# Patient Record
Sex: FEMALE | Race: WHITE | NOT HISPANIC OR LATINO | Employment: OTHER | URBAN - METROPOLITAN AREA
[De-identification: names, ages, dates, MRNs, and addresses within clinical notes are randomized per-mention and may not be internally consistent; named-entity substitution may affect disease eponyms.]

---

## 2024-04-09 ENCOUNTER — HOSPITAL ENCOUNTER (OUTPATIENT)
Dept: RADIOLOGY | Facility: CLINIC | Age: 84
Discharge: HOME | End: 2024-04-09
Payer: MEDICARE

## 2024-04-09 DIAGNOSIS — Z12.31 ENCOUNTER FOR SCREENING MAMMOGRAM FOR MALIGNANT NEOPLASM OF BREAST: ICD-10-CM

## 2024-04-09 PROCEDURE — 77067 SCR MAMMO BI INCL CAD: CPT

## 2024-04-09 PROCEDURE — 77063 BREAST TOMOSYNTHESIS BI: CPT | Performed by: RADIOLOGY

## 2024-04-09 PROCEDURE — 77067 SCR MAMMO BI INCL CAD: CPT | Performed by: RADIOLOGY

## 2024-04-18 ENCOUNTER — HOSPITAL ENCOUNTER (OUTPATIENT)
Dept: RADIOLOGY | Facility: CLINIC | Age: 84
Discharge: HOME | End: 2024-04-18
Payer: MEDICARE

## 2024-04-18 DIAGNOSIS — R92.8 OTHER ABNORMAL AND INCONCLUSIVE FINDINGS ON DIAGNOSTIC IMAGING OF BREAST: ICD-10-CM

## 2024-04-18 DIAGNOSIS — R92.1 MAMMOGRAPHIC CALCIFICATION FOUND ON DIAGNOSTIC IMAGING OF BREAST: ICD-10-CM

## 2024-04-18 PROCEDURE — 77061 BREAST TOMOSYNTHESIS UNI: CPT | Mod: RT

## 2024-04-18 PROCEDURE — G0279 TOMOSYNTHESIS, MAMMO: HCPCS | Mod: RIGHT SIDE | Performed by: RADIOLOGY

## 2024-04-18 PROCEDURE — 77065 DX MAMMO INCL CAD UNI: CPT | Mod: RIGHT SIDE | Performed by: RADIOLOGY

## 2024-04-19 DIAGNOSIS — R92.8 ABNORMAL FINDING ON BREAST IMAGING: ICD-10-CM

## 2024-04-29 ENCOUNTER — OFFICE VISIT (OUTPATIENT)
Dept: SURGERY | Facility: CLINIC | Age: 84
End: 2024-04-29
Payer: MEDICARE

## 2024-04-29 ENCOUNTER — HOSPITAL ENCOUNTER (OUTPATIENT)
Dept: RADIOLOGY | Facility: HOSPITAL | Age: 84
Discharge: HOME | End: 2024-04-29
Payer: MEDICARE

## 2024-04-29 ENCOUNTER — HOSPITAL ENCOUNTER (OUTPATIENT)
Dept: RADIOLOGY | Facility: CLINIC | Age: 84
Discharge: HOME | End: 2024-04-29
Payer: MEDICARE

## 2024-04-29 VITALS
SYSTOLIC BLOOD PRESSURE: 165 MMHG | BODY MASS INDEX: 43.94 KG/M2 | OXYGEN SATURATION: 93 % | DIASTOLIC BLOOD PRESSURE: 78 MMHG | HEART RATE: 69 BPM | TEMPERATURE: 98.2 F | HEIGHT: 63 IN | WEIGHT: 248 LBS | RESPIRATION RATE: 20 BRPM

## 2024-04-29 VITALS
SYSTOLIC BLOOD PRESSURE: 129 MMHG | OXYGEN SATURATION: 95 % | DIASTOLIC BLOOD PRESSURE: 78 MMHG | HEART RATE: 66 BPM | RESPIRATION RATE: 20 BRPM

## 2024-04-29 DIAGNOSIS — R92.8 ABNORMAL FINDINGS ON DIAGNOSTIC IMAGING OF BREAST: Primary | ICD-10-CM

## 2024-04-29 DIAGNOSIS — R92.8 ABNORMAL FINDING ON BREAST IMAGING: ICD-10-CM

## 2024-04-29 DIAGNOSIS — R92.8 OTHER ABNORMAL AND INCONCLUSIVE FINDINGS ON DIAGNOSTIC IMAGING OF BREAST: ICD-10-CM

## 2024-04-29 PROCEDURE — 1157F ADVNC CARE PLAN IN RCRD: CPT | Performed by: SURGERY

## 2024-04-29 PROCEDURE — 1036F TOBACCO NON-USER: CPT | Performed by: SURGERY

## 2024-04-29 PROCEDURE — 1159F MED LIST DOCD IN RCRD: CPT | Performed by: SURGERY

## 2024-04-29 PROCEDURE — 99203 OFFICE O/P NEW LOW 30 MIN: CPT | Performed by: SURGERY

## 2024-04-29 PROCEDURE — 88305 TISSUE EXAM BY PATHOLOGIST: CPT | Mod: TC,PARLAB | Performed by: SURGERY

## 2024-04-29 PROCEDURE — 1126F AMNT PAIN NOTED NONE PRSNT: CPT | Performed by: SURGERY

## 2024-04-29 PROCEDURE — 76098 X-RAY EXAM SURGICAL SPECIMEN: CPT

## 2024-04-29 PROCEDURE — 77065 DX MAMMO INCL CAD UNI: CPT

## 2024-04-29 PROCEDURE — 99213 OFFICE O/P EST LOW 20 MIN: CPT | Mod: 25 | Performed by: SURGERY

## 2024-04-29 PROCEDURE — 1160F RVW MEDS BY RX/DR IN RCRD: CPT | Performed by: SURGERY

## 2024-04-29 PROCEDURE — 2720000007 HC OR 272 NO HCPCS

## 2024-04-29 PROCEDURE — 19081 BX BREAST 1ST LESION STRTCTC: CPT | Mod: RT

## 2024-04-29 PROCEDURE — 88305 TISSUE EXAM BY PATHOLOGIST: CPT | Performed by: STUDENT IN AN ORGANIZED HEALTH CARE EDUCATION/TRAINING PROGRAM

## 2024-04-29 ASSESSMENT — PAIN SCALES - GENERAL: PAINLEVEL: 0-NO PAIN

## 2024-04-29 ASSESSMENT — ENCOUNTER SYMPTOMS
LOSS OF SENSATION IN FEET: 0
OCCASIONAL FEELINGS OF UNSTEADINESS: 1
DEPRESSION: 0

## 2024-04-29 NOTE — PATIENT INSTRUCTIONS
Thank you for choosing [facility].  Stereotactic biopsy of the right breast will be performed as discussed.  You may experience some bruising following the procedure.  Please use ice, 2 extra strength or 3 ibuprofen every 6 hours as needed for pain.  Results will be immediately available for viewing on the [employer] alexx when completed by the pathologist.  This is usually within 5-10 business days.  Follow-up breast clinic 2 weeks after your biopsy.  Contact the breast clinic for any questions regarding today's exam or your proposed procedure biopsy breast biopsy breast

## 2024-04-29 NOTE — DISCHARGE INSTRUCTIONS
REMOVE YOUR BANDAID OVER THE SITE TOMORROW BEFORE YOU SHOWER. UNDER THE BANDAID ARE STERI-STRIPS, KEEP THOSE ON UNTIL THEY FALL OFF ON THEIR OWN. IF THEY STAY ON FOR 5 DAYS, REMOVE THEM. NO SOAKING IN A BATH, POOL, OR HOT TUB FOR 48 HOURS. NO HEAVY LIFTING FOR 1-2 DAYS. USE ICE 20 MINUTES ON AND 20 MINUTES OFF TODAY. LOOK FOR SIGNS AND SYMPTOMS OF INFECTION- REDNESS, SWELLING, FEVER, AND PAIN. CALL IF YOU HAVE ANY OF THOSE. IT IS NORMAL TO BRUISE. IT CAN LAST UP TO A MONTH. TAKE OVER THE COUNTER PAIN MEDICATIONS FOR PAIN. YOUR RESULTS WILL POST TO YOUR MY CHART IN 3-10 DAYS. CALL WITH ANY QUESTIONS, [phone] OR [facility] BREAST NURSE NAVIGATOR [phone].

## 2024-04-29 NOTE — POST-PROCEDURE NOTE
Interventional Radiology Brief Postprocedure Note    Attending: Myra Walters MD      Assistant:     Diagnosis: right breast calcifications    Description of procedure: stereotactic biopsy right breast     Anesthesia:  Local    Complications: None    Estimated Blood Loss: none    Medications (Filter: Administrations occurring from 1410 to 1410 on 04/29/24)      None          ID Type Source Tests Collected by Time   1 : RIGHT BREAST 0500 12 CM FN Tissue BREAST CORE BIOPSY RIGHT SURGICAL PATHOLOGY EXAM Myra Walters MD 4/29/2024 1355         See detailed result report with images in PACS.    The patient tolerated the procedure well without incident or complication and is in stable condition.

## 2024-04-29 NOTE — PROGRESS NOTES
History Of Present Illness  HPI 83-year-old female underwent screening mammograms which demonstrated abnormal microcalcifications in the right aspect of the right breast.  She has a history of previous biopsy of right breast benign.  She currently denies mass dimpling this document was prepared by voice recognition  technology and unintended errors  may be present.  Her history is otherwise reviewed and listed in chart     Past Medical History  She has a past medical history of Endometrial intraepithelial neoplasia (EIN), Nonrheumatic mitral (valve) insufficiency, Overactive bladder, Overweight, Personal history of other diseases of the circulatory system, Personal history of other diseases of the musculoskeletal system and connective tissue, Personal history of other diseases of the respiratory system, Personal history of other endocrine, nutritional and metabolic disease, Personal history of other specified conditions, Personal history of other specified conditions, and Spondylosis without myelopathy or radiculopathy, cervical region.    Surgical History  She has a past surgical history that includes Other surgical history (06/14/2019); Other surgical history (06/14/2019); Other surgical history (06/14/2019); Incisional breast biopsy (06/14/2019); Dilation and curettage of uterus (06/14/2019); Incisional breast biopsy (06/14/2019); and Hysterectomy.     Social History  She has no history on file for tobacco use, alcohol use, and drug use.    Family History  No family history on file.     Allergies  Patient has no allergy information on record.    Review of Systems review of systems otherwise noncontributory     There were no vitals taken for this visit.     Physical Exam BMI 43.9  Ambulates with assistance device  GENERAL  MENTAL STATUS - Alert. GENERAL APPEARANCE - Cooperative and well groomed. ORIENTATION - Oriented X4. BUILD & NUTRITION - Well nourished and well developed. HYDRATION - Well  hydrated.    INTEGUMENTARY  GENERAL CHARACTERISTICS - Overall examination of the patient´s skin reveals no rashes. COLOR - not icteric. SKIN MOISTURE - normal skin moisture. TEMPERATURE - normal worth is noted.    HEAD AND NECK  HEAD  HEAD SHAPE - Atraumatic and normocephalic.  TRACHEA - midline.  THYROID  GLAND CHARACTERISTICS - normal size and consistency and no palpable nodules.    EYE  SCLERA/CONJUNCTIVA - BILATERAL - Anicteric.    CHEST AND LUNG EXAM  AUSCULTATION -  BREATH SOUNDS - Clear.    BREAST  NIPPLES: CHARACTERISTICS - LEFT - No rash. Not inverted. RIGHT - No rash. Not Inverted. DISCHARGE - LEFT - None. DISCHARGE - RIGHT - None.  BREAST - LEFT - Non tender. No mass, skin changes, biopsy scars, dimpling or dimpling or [place]au [place]. RIGHT - Non tender. No mass, skin changes, biopsy scars, dimpling or dimpling or [place]. BILATERAL - Symmetric.    CARDIOVASCULAR  AUSCULTATION: RHYTHM - Regular. HEART SOUNDS - Normal heart sounds.  MURMURS & OTHER HEART SOUNDS - Auscultation of the heart reveals regular rate and no murmurs.    ABDOMEN  PALPATION/PERCUSSION - Palpation and percussion of the abdomen reveal soft, non tender, no mass and no hepatosplenomegaly.    LYMPHATIC  GENERAL LYMPHATICS  BREAST LYMPHATIC EXAM - No cervical adenopathy, supraclavicular adenopathy or axilliary adenopathy.         Last Recorded Vitals  There were no vitals taken for this visit.    Relevant Results      BI mammo right diagnostic tomosynthesis    Result Date: 4/18/2024  Interpreted By:  Tor Hernández, STUDY: BI MAMMO RIGHT DIAGNOSTIC TOMOSYNTHESIS;  4/18/2024 3:39 pm   ACCESSION NUMBER(S): [id number]   ORDERING CLINICIAN: LACEY HAM   INDICATION: Signs/Symptoms:abnormal findings.   COMPARISON: Screening mammogram 04/09/2024   FINDINGS: 2D and tomosynthesis images were reviewed at 1 mm slice thickness. Magnification and true lateral views obtained.   Density:  The breast tissue is heterogeneously dense, which may  obscure small masses.   Grouping of microcalcifications at the inferomedial aspect are better visualized on magnification views and appear somewhat heterogeneous. Biopsy marker clips redemonstrated superiorly with some associated density likely relating to post biopsy changes. No definite additional suspicious masses or calcifications are otherwise identified.       Right breast microcalcifications as described for which stereotactic biopsy recommended.   Findings were communicated to the patient.   BI-RADS CATEGORY:   BI-RADS Category:  4 Suspicious. Recommendation:  Surgical Consultation and Biopsy. Recommended Date:  Immediate. Laterality:  Right.   For any future breast imaging appointments, please call [phone]).     MACRO: Critical Finding:  See findings. Notification was initiated on 4/18/2024 at 5:15 pm by  Tor Hernández.  (**-YCF-**) Instructions:   Signed by: Tor Hernández 4/18/2024 5:15 PM Dictation workstation:   [id number]    BI mammo bilateral screening tomosynthesis    Result Date: 4/10/2024  Interpreted By:  Myra Walters, STUDY: BI MAMMO BILATERAL SCREENING TOMOSYNTHESIS;  4/9/2024 2:46 pm   ACCESSION NUMBER(S): [id number]   ORDERING CLINICIAN: LACEY HAM   INDICATION: Screening. The study possible was performed. 2 technologists were needed in this was performed in a wheelchair   COMPARISON: 03/13/2023,   FINDINGS: 2D and tomosynthesis images were reviewed at 1 mm slice thickness.   Density:  The breast tissue is heterogeneously dense, which may obscure small masses.   There are calcifications in the lower-inner quadrant of the right breast for which spot compression magnification views are recommended. There are benign coarse calcifications. There are benign vascular calcifications. There are scattered round microcalcifications. There are 2 post biopsy clips in the right breast. No suspicious mass, skin thickening or axillary adenopathy is identified       1. Spot compression  magnification views microcalcifications right breast. 2. No malignancy left breast     Based on the Tyrer-Cuzick model for breast cancer risk assessment, the patient's lifetime risk of breast cancer is 0.95%. Patients with over a 20% lifetime risk of developing breast cancer may benefit from additional screening with breast MRI or ultrasound. Please note that this estimate is based on responses provided on the patient questionnaire. For more information regarding high risk consultation, please call [phone].   BI-RADS CATEGORY:   BI-RADS Category:  0 Incomplete; Need Additional Imaging Evaluation and/or Prior Mammograms for Comparison. Recommendation:  Additional Imaging. Recommended Date:  Immediate. Laterality:  Right.   For any future breast imaging appointments, please call [phone] ([phone]).   MACRO: None     Signed by: Myra Walters 4/10/2024 1:46 PM Dictation workstation:   [id number]    XR chest 2 views    Result Date: 4/2/2024  EXAMINATION: XR CHEST PA+LAT 2 VIEWS 04/02/2024 05:56 PM CLINICAL HISTORY: Chest pain ASSOCIATED DIAGNOSIS: Chest pain ORDERING PROVIDER: MELO ANDREWS TECHNOLOGISTS NOTE: COMPARISON: XR CHEST PA+LAT 10/23/2019, 3:44 PM FINDINGS: Cardiomediastinal silhouette: Normal. Lungs/Pleura: No focal pulmonary consolidation, effusion or pneumothorax. Musculoskeletal: Degenerative spurring within the thoracic spine. IMPRESSION: No acute cardiopulmonary abnormality identified. MACRO: None        @[facility]@    Assessment/Plan       I have reviewed the diagnostic imaging and agree with the recommendation for stereotactic biopsy.  I discussed the risk and benefits the alternatives as well as the risk and benefits the alternatives procedure including but not limited to the risk of bleeding infection for additional procedures for diagnosis and/or treatment.  Questions were answered.  Patient agrees to proceed       I spent 20 minutes in the professional and overall care of this  patient.      Lanre Garsia MD

## 2024-05-03 LAB
LABORATORY COMMENT REPORT: NORMAL
PATH REPORT.FINAL DX SPEC: NORMAL
PATH REPORT.GROSS SPEC: NORMAL
PATH REPORT.TOTAL CANCER: NORMAL

## 2024-05-08 ENCOUNTER — OFFICE VISIT (OUTPATIENT)
Dept: SURGERY | Facility: CLINIC | Age: 84
End: 2024-05-08
Payer: MEDICARE

## 2024-05-08 VITALS
WEIGHT: 253 LBS | BODY MASS INDEX: 44.83 KG/M2 | OXYGEN SATURATION: 96 % | HEART RATE: 67 BPM | HEIGHT: 63 IN | RESPIRATION RATE: 22 BRPM | SYSTOLIC BLOOD PRESSURE: 157 MMHG | TEMPERATURE: 98 F | DIASTOLIC BLOOD PRESSURE: 91 MMHG

## 2024-05-08 DIAGNOSIS — R92.8 ABNORMAL FINDINGS ON DIAGNOSTIC IMAGING OF BREAST: Primary | ICD-10-CM

## 2024-05-08 PROCEDURE — 1159F MED LIST DOCD IN RCRD: CPT | Performed by: SURGERY

## 2024-05-08 PROCEDURE — 1126F AMNT PAIN NOTED NONE PRSNT: CPT | Performed by: SURGERY

## 2024-05-08 PROCEDURE — 1157F ADVNC CARE PLAN IN RCRD: CPT | Performed by: SURGERY

## 2024-05-08 PROCEDURE — 1160F RVW MEDS BY RX/DR IN RCRD: CPT | Performed by: SURGERY

## 2024-05-08 PROCEDURE — 99213 OFFICE O/P EST LOW 20 MIN: CPT | Performed by: SURGERY

## 2024-05-08 ASSESSMENT — ENCOUNTER SYMPTOMS
OCCASIONAL FEELINGS OF UNSTEADINESS: 1
LOSS OF SENSATION IN FEET: 1
DEPRESSION: 1

## 2024-05-08 ASSESSMENT — COLUMBIA-SUICIDE SEVERITY RATING SCALE - C-SSRS
2. HAVE YOU ACTUALLY HAD ANY THOUGHTS OF KILLING YOURSELF?: NO
6. HAVE YOU EVER DONE ANYTHING, STARTED TO DO ANYTHING, OR PREPARED TO DO ANYTHING TO END YOUR LIFE?: NO
1. IN THE PAST MONTH, HAVE YOU WISHED YOU WERE DEAD OR WISHED YOU COULD GO TO SLEEP AND NOT WAKE UP?: NO

## 2024-05-08 ASSESSMENT — PAIN SCALES - GENERAL: PAINLEVEL: 0-NO PAIN

## 2024-05-08 NOTE — PATIENT INSTRUCTIONS
Thank you for choosing [facility].  Your recent right stereotactic biopsy site is without signs of infection.  Pathological analysis of the area demonstrates no evidence of cancer nor cells to indicate an increased risk of cancer.  Continue monthly self exam.  Annual mammograms recommended with clinical follow-up.  Contact the breast clinic for any questions regarding today's exam or your recent procedure

## 2024-05-08 NOTE — PROGRESS NOTES
History Of Present Illness  HPI follow-up stereotactic biopsy right breast April 29.  Pathology benign.  Minimal bruising only.  Denies redness swelling drainage.     Past Medical History  She has a past medical history of Endometrial intraepithelial neoplasia (EIN), Nonrheumatic mitral (valve) insufficiency, Overactive bladder, Overweight, Personal history of other diseases of the circulatory system, Personal history of other diseases of the musculoskeletal system and connective tissue, Personal history of other diseases of the respiratory system, Personal history of other endocrine, nutritional and metabolic disease, Personal history of other specified conditions, Personal history of other specified conditions, and Spondylosis without myelopathy or radiculopathy, cervical region.    Surgical History  She has a past surgical history that includes Other surgical history (06/14/2019); Other surgical history (06/14/2019); Other surgical history (06/14/2019); Incisional breast biopsy (06/14/2019); Dilation and curettage of uterus (06/14/2019); Incisional breast biopsy (06/14/2019); and Hysterectomy.     Social History  She reports that she has never smoked. She has never used smokeless tobacco. She reports that she does not currently use alcohol. She reports that she does not currently use drugs.    Family History  No family history on file.     Allergies  Bee venom protein (honey bee), Haemophilus influenzae type b, Metronidazole, Chlorhexidine, Dextromethorphan-guaifenesin, Flu vac 2023 65up-ocfdw99d(pf), Fluconazole, Moxifloxacin, Oxycodone, Peanut, Peas, Pneumococcal 7-michael conj vacc, Strawberry, Strawberry flavor, Sulfamethoxazole-trimethoprim, Tetracyclines, Trimethoprim, Cephalexin, Diphenhydramine, Erythromycin, Hydroxyzine, Ilosone, Oxaprozin, Oxycodone-acetaminophen, Paroxetine, Penicillins, Prednisone, Propoxyphene, Sulfamethoxazole, Terbinafine, Tetanus vaccines and toxoid, and Verapamil    Review of  Systems  10 review of systems otherwise negative  There were no vitals taken for this visit.     Physical Exam in wheelchair  GENERAL  MENTAL STATUS - Alert. GENERAL APPEARANCE - Cooperative and well groomed. ORIENTATION - Oriented X4. BUILD & NUTRITION - Well nourished and well developed. HYDRATION - Well hydrated.    INTEGUMENTARY  GENERAL CHARACTERISTICS - Overall examination of the patient´s skin reveals no rashes. COLOR - not icteric. SKIN MOISTURE - normal skin moisture. TEMPERATURE - normal worth is noted.    HEAD AND NECK  HEAD  HEAD SHAPE - Atraumatic and normocephalic.  TRACHEA - midline.  THYROID  GLAND CHARACTERISTICS - normal size and consistency and no palpable nodules.    EYE  SCLERA/CONJUNCTIVA - BILATERAL - Anicteric.    CHEST AND LUNG EXAM  AUSCULTATION -  BREATH SOUNDS - Clear.    BREAST  NIPPLES: CHARACTERISTICS - LEFT - No rash. Not inverted. RIGHT - No rash. Not Inverted. DISCHARGE - LEFT - None. DISCHARGE - RIGHT - None.  BREAST - LEFT - Non tender. No mass, skin changes, biopsy scars, dimpling or dimpling or Peau d´Orange. RIGHT - Non tender. No mass, ecchymosis noted at 3:00 not red not warm, no dimpling or dimpling or Peau d´Orange. BILATERAL - Symmetric.    CARDIOVASCULAR  AUSCULTATION: RHYTHM - Regular. HEART SOUNDS - Normal heart sounds.  MURMURS & OTHER HEART SOUNDS - Auscultation of the heart reveals regular rate and no murmurs.    ABDOMEN  PALPATION/PERCUSSION - Palpation and percussion of the abdomen reveal soft, non tender, no mass and no hepatosplenomegaly.    LYMPHATIC  GENERAL LYMPHATICS  BREAST LYMPHATIC EXAM - No cervical adenopathy, supraclavicular adenopathy or axilliary adenopathy.         Last Recorded Vitals  There were no vitals taken for this visit.    Relevant Results      BI breast biopsy clip imaging    Addendum Date: 5/3/2024    Interpreted By:  Myra Walters, ADDENDUM: The final pathology for stereotactic biopsy microcalcifications right breast: Benign breast  tissue with fibroadenomatoid changes and associated stromal calcifications. Pathology is benign-concordant   Recommendation: Routine mammographic screening can resume   MACRO: Critical Finding:  See findings. Notification was initiated on 5/3/2024 at 4:17 pm by  Myra Walters.  (**-YCF-**) Instructions:     Signed by: Myra Walters 5/3/2024 4:17 PM   -------- ORIGINAL REPORT -------- Dictation workstation:   [id number]    Result Date: 5/3/2024  Interpreted By:  Myra Walters, STUDY: BI RAD BREAST EXAM SPECIMEN; BI BREAST BIOPSY CLIP IMAGING; BI STEREOTACTIC GUIDED BREAST RIGHT LOCALIZATION AND BIOPSY;  4/29/2024 2:15 pm; 4/29/2024 2:14 pm; 4/29/2024 2:12 pm   ACCESSION NUMBER(S): [id number]; [id number]; [id number]   ORDERING CLINICIAN: ESTHER CUI   INDICATION: Signs/Symptoms:routine; Signs/Symptoms:post biopsy; Signs/Symptoms:right breast calcifications.  Right breast calcifications.   TECHNIQUE: PREPROCEDURE CONSULTATION: The procedure was explained to the patient including the risks, benefits, and alternatives.  Medications were discussed, including any prior use of blood thinning medications by the patient.  Patient allergies were reviewed.   The risks, including but not limited to infection and bleeding, were reviewed and the patient agreed to undergo the procedure.   Prior to the procedure, an audible timeout was done to verify patient identification, site and type of procedure.  The  right breast was marked prior to the procedure. Dr. Walters, a radiology nurse, and a mammography technologist were present.   FINDINGS: PROCEDURE: An upright vacuum assisted stereotactic biopsy was performed. A  view of the  right breast localized the calcifications of concern. A  medialapproach was used. The breast was prepped and draped in a sterile manner. 10 ML of buffered 1% lidocaine was injected subcutaneously and then into the deeper tissues. A small incision was made. Multiple tissue core  specimens were then obtained with a 9-gauge vacuum assisted biopsy needle with minimal bleeding (estimated blood loss less than 10 mL). The specimen radiograph demonstrated multiple calcifications in the tissues. A Securmark was then placed into the biopsy site. Postprocedure digital films confirm the clip is in place at the biopsy site. After discharging the patient, the tissue was immediately sent to the pathology department.   The patient experienced no complications during the procedure. Home-going/follow-up instructions were reviewed with the patient before she left the department.   POSTPROCEDURE MAMMOGRAM: Postprocedure mammogram is concordant. There is a titanium clip at the site of the previously seen microcalcifications in the  right breast. This confirms that the microcalcifications biopsied stereotactically are the same microcalcifications seen mammographically.       Status post stereotactic guided core needle biopsy of  right breast calcifications followed by tissue marker placement. Postprocedure mammogram concordant. Pathology is pending.   MACRO: None   Signed by: Myra Walters 4/29/2024 3:52 PM Dictation workstation:   [id number]    BI stereotactic guided breast right localization and biopsy    Addendum Date: 5/3/2024    Interpreted By:  Myra Walters, ADDENDUM: The final pathology for stereotactic biopsy microcalcifications right breast: Benign breast tissue with fibroadenomatoid changes and associated stromal calcifications. Pathology is benign-concordant   Recommendation: Routine mammographic screening can resume   MACRO: Critical Finding:  See findings. Notification was initiated on 5/3/2024 at 4:17 pm by  Myra Walters.  (**-YCF-**) Instructions:     Signed by: Myra Walters 5/3/2024 4:17 PM   -------- ORIGINAL REPORT -------- Dictation workstation:   [id number]    Result Date: 5/3/2024  Interpreted By:  Myra Walters, STUDY: BI RAD BREAST EXAM SPECIMEN; BI BREAST BIOPSY CLIP  IMAGING; BI STEREOTACTIC GUIDED BREAST RIGHT LOCALIZATION AND BIOPSY;  4/29/2024 2:15 pm; 4/29/2024 2:14 pm; 4/29/2024 2:12 pm   ACCESSION NUMBER(S): [id number]; [id number]; [id number]   ORDERING CLINICIAN: ESTHER CUI   INDICATION: Signs/Symptoms:routine; Signs/Symptoms:post biopsy; Signs/Symptoms:right breast calcifications.  Right breast calcifications.   TECHNIQUE: PREPROCEDURE CONSULTATION: The procedure was explained to the patient including the risks, benefits, and alternatives.  Medications were discussed, including any prior use of blood thinning medications by the patient.  Patient allergies were reviewed.   The risks, including but not limited to infection and bleeding, were reviewed and the patient agreed to undergo the procedure.   Prior to the procedure, an audible timeout was done to verify patient identification, site and type of procedure.  The  right breast was marked prior to the procedure. Dr. Walters, a radiology nurse, and a mammography technologist were present.   FINDINGS: PROCEDURE: An upright vacuum assisted stereotactic biopsy was performed. A  view of the  right breast localized the calcifications of concern. A  medialapproach was used. The breast was prepped and draped in a sterile manner. 10 ML of buffered 1% lidocaine was injected subcutaneously and then into the deeper tissues. A small incision was made. Multiple tissue core specimens were then obtained with a 9-gauge vacuum assisted biopsy needle with minimal bleeding (estimated blood loss less than 10 mL). The specimen radiograph demonstrated multiple calcifications in the tissues. A Securmark was then placed into the biopsy site. Postprocedure digital films confirm the clip is in place at the biopsy site. After discharging the patient, the tissue was immediately sent to the pathology department.   The patient experienced no complications during the procedure. Home-going/follow-up instructions were reviewed with the  patient before she left the department.   POSTPROCEDURE MAMMOGRAM: Postprocedure mammogram is concordant. There is a titanium clip at the site of the previously seen microcalcifications in the  right breast. This confirms that the microcalcifications biopsied stereotactically are the same microcalcifications seen mammographically.       Status post stereotactic guided core needle biopsy of  right breast calcifications followed by tissue marker placement. Postprocedure mammogram concordant. Pathology is pending.   MACRO: None   Signed by: Myra Walters 4/29/2024 3:52 PM Dictation workstation:   [id number]    BI RAD breast exam specimen    Addendum Date: 5/3/2024    Interpreted By:  Myra Walters, ADDENDUM: The final pathology for stereotactic biopsy microcalcifications right breast: Benign breast tissue with fibroadenomatoid changes and associated stromal calcifications. Pathology is benign-concordant   Recommendation: Routine mammographic screening can resume   MACRO: Critical Finding:  See findings. Notification was initiated on 5/3/2024 at 4:17 pm by  Myra Walters.  (**-YCF-**) Instructions:     Signed by: Myra Walters 5/3/2024 4:17 PM   -------- ORIGINAL REPORT -------- Dictation workstation:   [id number]    Result Date: 5/3/2024  Interpreted By:  Myra Walters, STUDY: BI RAD BREAST EXAM SPECIMEN; BI BREAST BIOPSY CLIP IMAGING; BI STEREOTACTIC GUIDED BREAST RIGHT LOCALIZATION AND BIOPSY;  4/29/2024 2:15 pm; 4/29/2024 2:14 pm; 4/29/2024 2:12 pm   ACCESSION NUMBER(S): [id number]; [id number]; [id number]   ORDERING CLINICIAN: ESTHER CUI   INDICATION: Signs/Symptoms:routine; Signs/Symptoms:post biopsy; Signs/Symptoms:right breast calcifications.  Right breast calcifications.   TECHNIQUE: PREPROCEDURE CONSULTATION: The procedure was explained to the patient including the risks, benefits, and alternatives.  Medications were discussed, including any prior use of blood thinning medications by the  patient.  Patient allergies were reviewed.   The risks, including but not limited to infection and bleeding, were reviewed and the patient agreed to undergo the procedure.   Prior to the procedure, an audible timeout was done to verify patient identification, site and type of procedure.  The  right breast was marked prior to the procedure. Dr. Walters, a radiology nurse, and a mammography technologist were present.   FINDINGS: PROCEDURE: An upright vacuum assisted stereotactic biopsy was performed. A  view of the  right breast localized the calcifications of concern. A  medialapproach was used. The breast was prepped and draped in a sterile manner. 10 ML of buffered 1% lidocaine was injected subcutaneously and then into the deeper tissues. A small incision was made. Multiple tissue core specimens were then obtained with a 9-gauge vacuum assisted biopsy needle with minimal bleeding (estimated blood loss less than 10 mL). The specimen radiograph demonstrated multiple calcifications in the tissues. A Securmark was then placed into the biopsy site. Postprocedure digital films confirm the clip is in place at the biopsy site. After discharging the patient, the tissue was immediately sent to the pathology department.   The patient experienced no complications during the procedure. Home-going/follow-up instructions were reviewed with the patient before she left the department.   POSTPROCEDURE MAMMOGRAM: Postprocedure mammogram is concordant. There is a titanium clip at the site of the previously seen microcalcifications in the  right breast. This confirms that the microcalcifications biopsied stereotactically are the same microcalcifications seen mammographically.       Status post stereotactic guided core needle biopsy of  right breast calcifications followed by tissue marker placement. Postprocedure mammogram concordant. Pathology is pending.   MACRO: None   Signed by: Myra Walters 4/29/2024 3:52 PM Dictation  workstation:   [id number]    BI mammo right diagnostic tomosynthesis    Result Date: 4/18/2024  Interpreted By:  Tor Hernández, STUDY: BI MAMMO RIGHT DIAGNOSTIC TOMOSYNTHESIS;  4/18/2024 3:39 pm   ACCESSION NUMBER(S): [id number]   ORDERING CLINICIAN: LACEY HAM   INDICATION: Signs/Symptoms:abnormal findings.   COMPARISON: Screening mammogram 04/09/2024   FINDINGS: 2D and tomosynthesis images were reviewed at 1 mm slice thickness. Magnification and true lateral views obtained.   Density:  The breast tissue is heterogeneously dense, which may obscure small masses.   Grouping of microcalcifications at the inferomedial aspect are better visualized on magnification views and appear somewhat heterogeneous. Biopsy marker clips redemonstrated superiorly with some associated density likely relating to post biopsy changes. No definite additional suspicious masses or calcifications are otherwise identified.       Right breast microcalcifications as described for which stereotactic biopsy recommended.   Findings were communicated to the patient.   BI-RADS CATEGORY:   BI-RADS Category:  4 Suspicious. Recommendation:  Surgical Consultation and Biopsy. Recommended Date:  Immediate. Laterality:  Right.   For any future breast imaging appointments, please call [phone]).     MACRO: Critical Finding:  See findings. Notification was initiated on 4/18/2024 at 5:15 pm by  Tor Hernández.  (**-YCF-**) Instructions:   Signed by: Tor Hernández 4/18/2024 5:15 PM Dictation workstation:   [id number]    BI mammo bilateral screening tomosynthesis    Result Date: 4/10/2024  Interpreted By:  Myra Walters, STUDY: BI MAMMO BILATERAL SCREENING TOMOSYNTHESIS;  4/9/2024 2:46 pm   ACCESSION NUMBER(S): [id number]   ORDERING CLINICIAN: LACEY HAM   INDICATION: Screening. The study possible was performed. 2 technologists were needed in this was performed in a wheelchair   COMPARISON: 03/13/2023,   FINDINGS: 2D and  tomosynthesis images were reviewed at 1 mm slice thickness.   Density:  The breast tissue is heterogeneously dense, which may obscure small masses.   There are calcifications in the lower-inner quadrant of the right breast for which spot compression magnification views are recommended. There are benign coarse calcifications. There are benign vascular calcifications. There are scattered round microcalcifications. There are 2 post biopsy clips in the right breast. No suspicious mass, skin thickening or axillary adenopathy is identified       1. Spot compression magnification views microcalcifications right breast. 2. No malignancy left breast     Based on the Tyrer-Cuzick model for breast cancer risk assessment, the patient's lifetime risk of breast cancer is 0.95%. Patients with over a 20% lifetime risk of developing breast cancer may benefit from additional screening with breast MRI or ultrasound. Please note that this estimate is based on responses provided on the patient questionnaire. For more information regarding high risk consultation, please call [phone].   BI-RADS CATEGORY:   BI-RADS Category:  0 Incomplete; Need Additional Imaging Evaluation and/or Prior Mammograms for Comparison. Recommendation:  Additional Imaging. Recommended Date:  Immediate. Laterality:  Right.   For any future breast imaging appointments, please call [phone]).   MACRO: None     Signed by: Myra Watlers 4/10/2024 1:46 PM Dictation workstation:   [id number]        @Smore@    Assessment/Plan       I have reviewed the diagnostic imaging from the procedure as well as the pathological report.  Findings concordant and pathology is benign.  Annual mammograms.  Continue monthly self-exam       I spent 20 minutes in the professional and overall care of this patient.      Lanre Garsia MD

## 2025-03-19 ENCOUNTER — APPOINTMENT (OUTPATIENT)
Dept: SURGERY | Facility: CLINIC | Age: 85
End: 2025-03-19
Payer: MEDICARE

## 2025-03-19 ENCOUNTER — APPOINTMENT (OUTPATIENT)
Dept: RADIOLOGY | Facility: CLINIC | Age: 85
End: 2025-03-19
Payer: MEDICARE

## 2025-03-26 ENCOUNTER — APPOINTMENT (OUTPATIENT)
Dept: SURGERY | Facility: CLINIC | Age: 85
End: 2025-03-26
Payer: MEDICARE

## 2025-03-26 ENCOUNTER — APPOINTMENT (OUTPATIENT)
Dept: RADIOLOGY | Facility: CLINIC | Age: 85
End: 2025-03-26
Payer: MEDICARE

## 2025-04-16 ENCOUNTER — HOSPITAL ENCOUNTER (OUTPATIENT)
Dept: RADIOLOGY | Facility: CLINIC | Age: 85
Discharge: HOME | End: 2025-04-16
Payer: MEDICARE

## 2025-04-16 ENCOUNTER — OFFICE VISIT (OUTPATIENT)
Dept: SURGERY | Facility: CLINIC | Age: 85
End: 2025-04-16
Payer: MEDICARE

## 2025-04-16 ENCOUNTER — APPOINTMENT (OUTPATIENT)
Dept: RADIOLOGY | Facility: HOSPITAL | Age: 85
End: 2025-04-16
Payer: MEDICARE

## 2025-04-16 VITALS
OXYGEN SATURATION: 96 % | HEIGHT: 61 IN | HEART RATE: 73 BPM | SYSTOLIC BLOOD PRESSURE: 159 MMHG | TEMPERATURE: 96.5 F | DIASTOLIC BLOOD PRESSURE: 85 MMHG | BODY MASS INDEX: 45.31 KG/M2 | WEIGHT: 240 LBS | RESPIRATION RATE: 20 BRPM

## 2025-04-16 VITALS — BODY MASS INDEX: 44.92 KG/M2 | WEIGHT: 253.53 LBS | HEIGHT: 63 IN

## 2025-04-16 DIAGNOSIS — Z12.31 BREAST CANCER SCREENING BY MAMMOGRAM: Primary | ICD-10-CM

## 2025-04-16 DIAGNOSIS — R92.8 ABNORMAL FINDINGS ON DIAGNOSTIC IMAGING OF BREAST: ICD-10-CM

## 2025-04-16 PROCEDURE — 77062 BREAST TOMOSYNTHESIS BI: CPT

## 2025-04-16 PROCEDURE — 99213 OFFICE O/P EST LOW 20 MIN: CPT | Performed by: SURGERY

## 2025-04-16 PROCEDURE — 1159F MED LIST DOCD IN RCRD: CPT | Performed by: SURGERY

## 2025-04-16 PROCEDURE — 1036F TOBACCO NON-USER: CPT | Performed by: SURGERY

## 2025-04-16 PROCEDURE — 1157F ADVNC CARE PLAN IN RCRD: CPT | Performed by: SURGERY

## 2025-04-16 PROCEDURE — 1126F AMNT PAIN NOTED NONE PRSNT: CPT | Performed by: SURGERY

## 2025-04-16 SDOH — ECONOMIC STABILITY: FOOD INSECURITY: WITHIN THE PAST 12 MONTHS, YOU WORRIED THAT YOUR FOOD WOULD RUN OUT BEFORE YOU GOT MONEY TO BUY MORE.: NEVER TRUE

## 2025-04-16 SDOH — ECONOMIC STABILITY: FOOD INSECURITY: WITHIN THE PAST 12 MONTHS, THE FOOD YOU BOUGHT JUST DIDN'T LAST AND YOU DIDN'T HAVE MONEY TO GET MORE.: NEVER TRUE

## 2025-04-16 ASSESSMENT — PAIN SCALES - GENERAL: PAINLEVEL_OUTOF10: 0-NO PAIN

## 2025-04-16 ASSESSMENT — PATIENT HEALTH QUESTIONNAIRE - PHQ9
1. LITTLE INTEREST OR PLEASURE IN DOING THINGS: NOT AT ALL
SUM OF ALL RESPONSES TO PHQ9 QUESTIONS 1 & 2: 0
2. FEELING DOWN, DEPRESSED OR HOPELESS: NOT AT ALL

## 2025-04-16 ASSESSMENT — COLUMBIA-SUICIDE SEVERITY RATING SCALE - C-SSRS
1. IN THE PAST MONTH, HAVE YOU WISHED YOU WERE DEAD OR WISHED YOU COULD GO TO SLEEP AND NOT WAKE UP?: NO
6. HAVE YOU EVER DONE ANYTHING, STARTED TO DO ANYTHING, OR PREPARED TO DO ANYTHING TO END YOUR LIFE?: NO
2. HAVE YOU ACTUALLY HAD ANY THOUGHTS OF KILLING YOURSELF?: NO

## 2025-04-16 ASSESSMENT — ENCOUNTER SYMPTOMS
DEPRESSION: 0
LOSS OF SENSATION IN FEET: 0
OCCASIONAL FEELINGS OF UNSTEADINESS: 1

## 2025-04-16 ASSESSMENT — LIFESTYLE VARIABLES
HOW MANY STANDARD DRINKS CONTAINING ALCOHOL DO YOU HAVE ON A TYPICAL DAY: PATIENT DOES NOT DRINK
HOW OFTEN DO YOU HAVE SIX OR MORE DRINKS ON ONE OCCASION: NEVER
SKIP TO QUESTIONS 9-10: 1
AUDIT-C TOTAL SCORE: 0
HOW OFTEN DO YOU HAVE A DRINK CONTAINING ALCOHOL: NEVER

## 2025-04-16 NOTE — PATIENT INSTRUCTIONS
Thank you for choosing [facility].  Today's breast exam as well as your mammograms are without abnormality following your right breast biopsy in 2024.  Please continue monthly self-exam.  Please seek attention for any change in self-exam including but not limited to mass dimpling discharge or any concern.  Annual mammograms with exam are recommended.

## 2025-04-16 NOTE — PROGRESS NOTES
History Of Present Illness  HPI 1 year follow-up to benign right stereotactic breast biopsy.  Currently denies mass dimpling discharge in either breast     Past Medical History  She has a past medical history of Endometrial intraepithelial neoplasia (EIN), Nonrheumatic mitral (valve) insufficiency, Overactive bladder, Overweight, Personal history of other diseases of the circulatory system, Personal history of other diseases of the musculoskeletal system and connective tissue, Personal history of other diseases of the respiratory system, Personal history of other endocrine, nutritional and metabolic disease, Personal history of other specified conditions, Personal history of other specified conditions, and Spondylosis without myelopathy or radiculopathy, cervical region.    Surgical History  She has a past surgical history that includes Other surgical history (06/14/2019); Other surgical history (06/14/2019); Other surgical history (06/14/2019); Incisional breast biopsy (06/14/2019); Dilation and curettage of uterus (06/14/2019); Incisional breast biopsy (06/14/2019); Hysterectomy; and vacumn assist breast bx rt (Right, 04/29/2024).     Social History  She reports that she has never smoked. She has never used smokeless tobacco. She reports that she does not currently use alcohol. She reports that she does not currently use drugs.    Family History  Family History[1]     Allergies  Bee venom protein (honey bee), Haemophilus influenzae type b, Metronidazole, Chlorhexidine, Dextromethorphan-guaifenesin, Flu vac 2023 65up-iklra01w(pf), Fluconazole, Moxifloxacin, Oxycodone, Peanut, Peas, Pneumococcal 7-michael conj vacc, Strawberry, Strawberry flavor, Sulfamethoxazole-trimethoprim, Tetracyclines, Trimethoprim, Cephalexin, Diphenhydramine, Erythromycin, Hydroxyzine, Ilosone, Oxaprozin, Oxycodone-acetaminophen, Paroxetine, Penicillins, Prednisone, Propoxyphene, Sulfamethoxazole, Terbinafine, Tetanus vaccines and toxoid, and  Verapamil    Review of Systems complains of mild cough today only.     There were no vitals taken for this visit.     Physical Exam BMI 45.3  Ambulates with assistance device  GENERAL  MENTAL STATUS - Alert. GENERAL APPEARANCE - Cooperative and well groomed. ORIENTATION - Oriented X4. BUILD & NUTRITION - Well nourished and well developed. HYDRATION - Well hydrated.    INTEGUMENTARY  GENERAL CHARACTERISTICS - Overall examination of the patient's skin reveals no rashes. COLOR - not icteric. SKIN MOISTURE - normal skin moisture. TEMPERATURE - normal worth is noted.    HEAD AND NECK  HEAD  HEAD SHAPE - Atraumatic and normocephalic.  TRACHEA - midline.  THYROID  GLAND CHARACTERISTICS - normal size and consistency and no palpable nodules.    EYE  SCLERA/CONJUNCTIVA - BILATERAL - Anicteric.    CHEST AND LUNG EXAM  AUSCULTATION -  BREATH SOUNDS - Clear.    BREAST  NIPPLES: CHARACTERISTICS - LEFT - No rash. Not inverted. RIGHT - No rash. Not Inverted. DISCHARGE - LEFT - None. DISCHARGE - RIGHT - None.  BREAST - LEFT - Non tender. No mass, skin changes, biopsy scars, dimpling or dimpling or Peau d'Orange. RIGHT - Non tender. No mass, skin changes, biopsy scars, dimpling or dimpling or Peau d'Orange. BILATERAL - Symmetric.  Pendulous    CARDIOVASCULAR  AUSCULTATION: RHYTHM - Regular. HEART SOUNDS - Normal heart sounds.  MURMURS & OTHER HEART SOUNDS - Auscultation of the heart reveals regular rate and no murmurs.    ABDOMEN  PALPATION/PERCUSSION - Palpation and percussion of the abdomen reveal soft, non tender, no mass and no hepatosplenomegaly.    LYMPHATIC  GENERAL LYMPHATICS  BREAST LYMPHATIC EXAM - No cervical adenopathy, supraclavicular adenopathy or axilliary adenopathy.         Last Recorded Vitals  There were no vitals taken for this visit.    Relevant Results      Imaging  BI mammo bilateral diagnostic tomosynthesis  Result Date: 4/16/2025  No mammographic evidence of malignancy.   BI-RADS CATEGORY:   BI-RADS Category:   2 Benign. Recommendation:  Annual Screening. Recommended Date:  1 Year. Laterality:  Bilateral.   For any future breast imaging appointments, please call [phone]).     MACRO: None   Signed by: Bernabe Valencia 4/16/2025 1:54 PM Dictation workstation:   [id number]      Cardiology, Vascular, and Other Imaging  No other imaging results found for the past 7 days     I have reviewed the diagnostic imaging.  BI-RADS 2   @[email or web address]@    Assessment/Plan          Clinically and mammographically without abnormality 1 year following stereotactic benign right breast biopsy.  Continue monthly self exam annual mammograms with exam recommended    I spent 20 minutes in the professional and overall care of this patient.      Lanre Garsia MD         [1] No family history on file.